# Patient Record
Sex: MALE | Race: WHITE | NOT HISPANIC OR LATINO
[De-identification: names, ages, dates, MRNs, and addresses within clinical notes are randomized per-mention and may not be internally consistent; named-entity substitution may affect disease eponyms.]

---

## 2017-08-20 ENCOUNTER — FORM ENCOUNTER (OUTPATIENT)
Age: 67
End: 2017-08-20

## 2017-08-20 ENCOUNTER — EMERGENCY (EMERGENCY)
Facility: HOSPITAL | Age: 67
LOS: 1 days | Discharge: HOME | End: 2017-08-20
Attending: EMERGENCY MEDICINE | Admitting: EMERGENCY MEDICINE
Payer: MEDICARE

## 2017-08-20 VITALS
OXYGEN SATURATION: 99 % | RESPIRATION RATE: 16 BRPM | HEART RATE: 64 BPM | WEIGHT: 169.98 LBS | SYSTOLIC BLOOD PRESSURE: 161 MMHG | DIASTOLIC BLOOD PRESSURE: 82 MMHG | TEMPERATURE: 99 F

## 2017-08-20 PROCEDURE — 99283 EMERGENCY DEPT VISIT LOW MDM: CPT | Mod: GC

## 2017-08-20 PROCEDURE — 73502 X-RAY EXAM HIP UNI 2-3 VIEWS: CPT | Mod: 26,LT

## 2017-08-20 PROCEDURE — 73502 X-RAY EXAM HIP UNI 2-3 VIEWS: CPT

## 2017-08-20 PROCEDURE — 72170 X-RAY EXAM OF PELVIS: CPT

## 2017-08-20 PROCEDURE — 72170 X-RAY EXAM OF PELVIS: CPT | Mod: 26,59

## 2017-08-20 PROCEDURE — 99284 EMERGENCY DEPT VISIT MOD MDM: CPT | Mod: 25

## 2017-08-20 NOTE — ED PROVIDER NOTE - OBJECTIVE STATEMENT
68 yo w/ pmh hernia repair p/w L hip pain (L sacroiliac joint) radiating to L groin through pelvis, began 1 wk ago when pt carried 80 lb weight upstairs. Has been taking motrin and tylenol, helped at first, pain kept getting worse, pt worked out through pain, decided to go to ED today, has outpt ortho clinic appointment tomorrow. Pt has taken 2 motrin 3 tylenol last 8 hrs w/ minimal pain relief. Pt has been very active w/ exercise. Pain worse w/ wt bearing, goes away if laying flat. 68 yo w/ pmh hernia repair p/w L hip pain (L sacroiliac joint) radiating to L groin through pelvis, began 1 wk ago when pt carried 80 lb weight upstairs. Has been taking motrin and tylenol, helped at first, pain kept getting worse, pt worked out through pain, decided to go to ED today, has outpt ortho clinic appointment tomorrow. Pt has taken 2 motrin 3 tylenol last 8 hrs w/ minimal pain relief. Pt has been very active w/ exercise. Pain worse w/ wt bearing, goes away if laying flat.       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 Pain sharp and exac by wt bearing and with radiation to anterior hip.  exac by mvm.  no numbness.  no relief with 400 mgs of ibuprofen.

## 2017-08-20 NOTE — ED PROVIDER NOTE - PHYSICAL EXAMINATION
*Gen:   leaning forward, mildly uncomfortable appearing, AOx3  *Eyes:   PERRL, extra-occular movements intact  *HEENT:   airway patent, most mucosal membranes  *CV:   regular rate and rhythm, normal S1/S2  *Resp:   clear to auscultation bilaterally, non-labored  *Abd:   soft, non tender, no guarding  *:   no cva tenderness  *MSK:   no focal musculoskeletal tenderness, no pelvic instability, no gross deformity  *Skin:   dry, intact  *Neuro:   AOx3, no focal weakness or loss of sensation, stands and walks in apparent pain *Gen:   leaning forward, mildly uncomfortable appearing, AOx3  *Eyes:   PERRL, extra-occular movements intact  *HEENT:   airway patent, most mucosal membranes  *CV:   regular rate and rhythm, normal S1/S2  *Resp:   clear to auscultation bilaterally, non-labored  *Abd:   soft, non tender, no guarding  *:   no cva tenderness  *MSK:   no focal musculoskeletal tenderness, no pelvic instability, no gross deformity  *Skin:   dry, intact  *Neuro:   AOx3, no focal weakness or loss of sensation, stands and walks in apparent pain       Attn - alert, nad, pain exac by mvm off stretcher and from sitting to standing.  min tender over L SI joint and tender over left AIIS and resisted hip flexion.  FADIR and ZION neg.  trendelenberg +  lower ext-, skin-, no lesions.

## 2017-08-20 NOTE — ED PROVIDER NOTE - MEDICAL DECISION MAKING DETAILS
68 yo M w/ chronic L hip pain acutely exacerbated s/p heavy lift, suspect SI strain. 68 yo M w/ chronic L hip pain acutely exacerbated s/p heavy lift, suspect SI strain.  Attn - prob left SI and hip flexor strain.  xrays neg.   Ibuprofen up to 800 q 8 hrs - start with 600 TID.  Follow up with Ortho if cont pain more than one week.  avoid prolonged standing.

## 2017-08-20 NOTE — ED PROVIDER NOTE - NS ED ROS FT
CONST: no fevers, no chills, no lightheadedness  EYES: no pain, no vision change  HENT: atraumatic, no sore throat  CV: no chest pain, no palpitations  RESP: no shortness of breath  ABD: no abdominal pain, no nausea/vomiting  : no dysuria, no hematuria  MSK: L hip and groin pain  NEURO: no headache, no focal weakness or loss of sensation  SKIN:  no rash, no lesions

## 2017-08-20 NOTE — ED ADULT NURSE NOTE - OBJECTIVE STATEMENT
Pt is a 68 yo male with the co left hip/lower back pain x 1 week after attempting to lift his 80 pound dog. Pt states that he usually gets pain like this in the same area but with rest it typically resolves on it own. 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 Pt reports taking tylenol and advil at home with minor relief from the pain. Pt states he has been under dosing his pain medications because he doesn't usually like to take pills. Pain becomes worse with ambulating and movement. Pt denies any numbness or tingling, no CP or SOB no N/V/D no cough fever or chills. Pt denies any pmh

## 2017-08-21 ENCOUNTER — APPOINTMENT (OUTPATIENT)
Dept: MRI IMAGING | Facility: IMAGING CENTER | Age: 67
End: 2017-08-21
Payer: MEDICARE

## 2017-08-21 ENCOUNTER — OUTPATIENT (OUTPATIENT)
Dept: OUTPATIENT SERVICES | Facility: HOSPITAL | Age: 67
LOS: 1 days | End: 2017-08-21
Payer: MEDICARE

## 2017-08-21 ENCOUNTER — APPOINTMENT (OUTPATIENT)
Dept: ORTHOPEDIC SURGERY | Facility: CLINIC | Age: 67
End: 2017-08-21
Payer: MEDICARE

## 2017-08-21 VITALS — WEIGHT: 170 LBS | HEIGHT: 72 IN | BODY MASS INDEX: 23.03 KG/M2

## 2017-08-21 DIAGNOSIS — M54.5 LOW BACK PAIN: ICD-10-CM

## 2017-08-21 DIAGNOSIS — Z00.8 ENCOUNTER FOR OTHER GENERAL EXAMINATION: ICD-10-CM

## 2017-08-21 PROCEDURE — 99204 OFFICE O/P NEW MOD 45 MIN: CPT

## 2017-08-21 PROCEDURE — 72148 MRI LUMBAR SPINE W/O DYE: CPT | Mod: 26

## 2017-08-21 PROCEDURE — 72148 MRI LUMBAR SPINE W/O DYE: CPT

## 2017-08-21 PROCEDURE — 72100 X-RAY EXAM L-S SPINE 2/3 VWS: CPT

## 2017-08-27 ENCOUNTER — MOBILE ON CALL (OUTPATIENT)
Age: 67
End: 2017-08-27

## 2018-09-26 ENCOUNTER — NON-APPOINTMENT (OUTPATIENT)
Age: 68
End: 2018-09-26

## 2018-09-26 ENCOUNTER — APPOINTMENT (OUTPATIENT)
Dept: CARDIOTHORACIC SURGERY | Facility: CLINIC | Age: 68
End: 2018-09-26
Payer: MEDICARE

## 2018-09-26 ENCOUNTER — TRANSCRIPTION ENCOUNTER (OUTPATIENT)
Age: 68
End: 2018-09-26

## 2018-09-26 VITALS
HEART RATE: 64 BPM | RESPIRATION RATE: 15 BRPM | HEIGHT: 72 IN | SYSTOLIC BLOOD PRESSURE: 136 MMHG | WEIGHT: 166 LBS | TEMPERATURE: 98.2 F | DIASTOLIC BLOOD PRESSURE: 72 MMHG | OXYGEN SATURATION: 97 % | BODY MASS INDEX: 22.48 KG/M2

## 2018-09-26 DIAGNOSIS — R73.9 HYPERGLYCEMIA, UNSPECIFIED: ICD-10-CM

## 2018-09-26 PROCEDURE — 93000 ELECTROCARDIOGRAM COMPLETE: CPT

## 2018-09-26 PROCEDURE — 99214 OFFICE O/P EST MOD 30 MIN: CPT | Mod: 25

## 2018-09-26 RX ORDER — IBUPROFEN 800 MG
TABLET ORAL
Refills: 0 | Status: DISCONTINUED | COMMUNITY

## 2018-09-26 RX ORDER — CYCLOBENZAPRINE HYDROCHLORIDE 10 MG/1
10 TABLET, FILM COATED ORAL 3 TIMES DAILY
Qty: 90 | Refills: 1 | Status: DISCONTINUED | COMMUNITY
Start: 2017-08-23 | End: 2018-09-26

## 2018-10-08 LAB
ALBUMIN SERPL ELPH-MCNC: 4.5 G/DL
ALP BLD-CCNC: 49 U/L
ALT SERPL-CCNC: 22 U/L
ANION GAP SERPL CALC-SCNC: 15 MMOL/L
AST SERPL-CCNC: 22 U/L
BASOPHILS # BLD AUTO: 0.01 K/UL
BASOPHILS NFR BLD AUTO: 0.3 %
BILIRUB SERPL-MCNC: 0.4 MG/DL
BUN SERPL-MCNC: 27 MG/DL
CALCIUM SERPL-MCNC: 9.2 MG/DL
CHLORIDE SERPL-SCNC: 104 MMOL/L
CHOLEST SERPL-MCNC: 244 MG/DL
CHOLEST/HDLC SERPL: 2.6 RATIO
CO2 SERPL-SCNC: 25 MMOL/L
CREAT SERPL-MCNC: 1.14 MG/DL
EOSINOPHIL # BLD AUTO: 0.07 K/UL
EOSINOPHIL NFR BLD AUTO: 2.3 %
GLUCOSE SERPL-MCNC: 113 MG/DL
HBA1C MFR BLD HPLC: 6.6 %
HCT VFR BLD CALC: 37.8 %
HDLC SERPL-MCNC: 93 MG/DL
HGB BLD-MCNC: 12.3 G/DL
IMM GRANULOCYTES NFR BLD AUTO: 0 %
LDLC SERPL CALC-MCNC: 135 MG/DL
LYMPHOCYTES # BLD AUTO: 1.31 K/UL
LYMPHOCYTES NFR BLD AUTO: 42.7 %
MAN DIFF?: NORMAL
MCHC RBC-ENTMCNC: 31.4 PG
MCHC RBC-ENTMCNC: 32.5 GM/DL
MCV RBC AUTO: 96.4 FL
MONOCYTES # BLD AUTO: 0.28 K/UL
MONOCYTES NFR BLD AUTO: 9.1 %
NEUTROPHILS # BLD AUTO: 1.4 K/UL
NEUTROPHILS NFR BLD AUTO: 45.6 %
PLATELET # BLD AUTO: 166 K/UL
POTASSIUM SERPL-SCNC: 4.1 MMOL/L
PROT SERPL-MCNC: 7 G/DL
RBC # BLD: 3.92 M/UL
RBC # FLD: 14.5 %
SODIUM SERPL-SCNC: 144 MMOL/L
TRIGL SERPL-MCNC: 80 MG/DL
WBC # FLD AUTO: 3.07 K/UL

## 2019-09-09 ENCOUNTER — TRANSCRIPTION ENCOUNTER (OUTPATIENT)
Age: 69
End: 2019-09-09